# Patient Record
Sex: MALE | Race: WHITE | NOT HISPANIC OR LATINO | Employment: UNEMPLOYED | URBAN - METROPOLITAN AREA
[De-identification: names, ages, dates, MRNs, and addresses within clinical notes are randomized per-mention and may not be internally consistent; named-entity substitution may affect disease eponyms.]

---

## 2021-11-07 ENCOUNTER — HOSPITAL ENCOUNTER (EMERGENCY)
Facility: HOSPITAL | Age: 31
Discharge: HOME | End: 2021-11-07
Attending: EMERGENCY MEDICINE | Admitting: EMERGENCY MEDICINE

## 2021-11-07 ENCOUNTER — APPOINTMENT (OUTPATIENT)
Dept: ULTRASOUND IMAGING | Facility: HOSPITAL | Age: 31
End: 2021-11-07

## 2021-11-07 ENCOUNTER — APPOINTMENT (OUTPATIENT)
Dept: CT IMAGING | Facility: HOSPITAL | Age: 31
End: 2021-11-07

## 2021-11-07 VITALS
TEMPERATURE: 97.1 F | OXYGEN SATURATION: 95 % | RESPIRATION RATE: 20 BRPM | HEIGHT: 70 IN | SYSTOLIC BLOOD PRESSURE: 120 MMHG | DIASTOLIC BLOOD PRESSURE: 69 MMHG | HEART RATE: 76 BPM

## 2021-11-07 DIAGNOSIS — N20.1 LEFT URETERAL STONE: Primary | ICD-10-CM

## 2021-11-07 LAB
ALBUMIN SERPL-MCNC: 4.9 G/DL (ref 3.5–5.2)
ALBUMIN/GLOB SERPL: 2.5 G/DL
ALP SERPL-CCNC: 70 U/L (ref 39–117)
ALT SERPL W P-5'-P-CCNC: 34 U/L (ref 1–41)
ANION GAP SERPL CALCULATED.3IONS-SCNC: 15.4 MMOL/L (ref 5–15)
AST SERPL-CCNC: 17 U/L (ref 1–40)
BACTERIA UR QL AUTO: ABNORMAL /HPF
BASOPHILS # BLD AUTO: 0.11 10*3/MM3 (ref 0–0.2)
BASOPHILS NFR BLD AUTO: 1.4 % (ref 0–1.5)
BILIRUB SERPL-MCNC: 0.4 MG/DL (ref 0–1.2)
BILIRUB UR QL STRIP: NEGATIVE
BUN SERPL-MCNC: 10 MG/DL (ref 6–20)
BUN/CREAT SERPL: 9.2 (ref 7–25)
CALCIUM SPEC-SCNC: 9.5 MG/DL (ref 8.6–10.5)
CHLORIDE SERPL-SCNC: 101 MMOL/L (ref 98–107)
CLARITY UR: ABNORMAL
CO2 SERPL-SCNC: 26.6 MMOL/L (ref 22–29)
COLOR UR: ABNORMAL
CREAT SERPL-MCNC: 1.09 MG/DL (ref 0.76–1.27)
DEPRECATED RDW RBC AUTO: 38.1 FL (ref 37–54)
EOSINOPHIL # BLD AUTO: 0.35 10*3/MM3 (ref 0–0.4)
EOSINOPHIL NFR BLD AUTO: 4.4 % (ref 0.3–6.2)
ERYTHROCYTE [DISTWIDTH] IN BLOOD BY AUTOMATED COUNT: 12.8 % (ref 12.3–15.4)
GFR SERPL CREATININE-BSD FRML MDRD: 79 ML/MIN/1.73
GFR SERPL CREATININE-BSD FRML MDRD: 96 ML/MIN/1.73
GLOBULIN UR ELPH-MCNC: 2 GM/DL
GLUCOSE SERPL-MCNC: 147 MG/DL (ref 65–99)
GLUCOSE UR STRIP-MCNC: NEGATIVE MG/DL
HCT VFR BLD AUTO: 44.6 % (ref 37.5–51)
HGB BLD-MCNC: 15.9 G/DL (ref 13–17.7)
HGB UR QL STRIP.AUTO: ABNORMAL
HYALINE CASTS UR QL AUTO: ABNORMAL /LPF
IMM GRANULOCYTES # BLD AUTO: 0.02 10*3/MM3 (ref 0–0.05)
IMM GRANULOCYTES NFR BLD AUTO: 0.3 % (ref 0–0.5)
KETONES UR QL STRIP: NEGATIVE
LEUKOCYTE ESTERASE UR QL STRIP.AUTO: ABNORMAL
LIPASE SERPL-CCNC: 67 U/L (ref 13–60)
LYMPHOCYTES # BLD AUTO: 3.39 10*3/MM3 (ref 0.7–3.1)
LYMPHOCYTES NFR BLD AUTO: 42.4 % (ref 19.6–45.3)
MCH RBC QN AUTO: 29.7 PG (ref 26.6–33)
MCHC RBC AUTO-ENTMCNC: 35.7 G/DL (ref 31.5–35.7)
MCV RBC AUTO: 83.4 FL (ref 79–97)
MONOCYTES # BLD AUTO: 0.5 10*3/MM3 (ref 0.1–0.9)
MONOCYTES NFR BLD AUTO: 6.3 % (ref 5–12)
NEUTROPHILS NFR BLD AUTO: 3.62 10*3/MM3 (ref 1.7–7)
NEUTROPHILS NFR BLD AUTO: 45.2 % (ref 42.7–76)
NITRITE UR QL STRIP: NEGATIVE
NRBC BLD AUTO-RTO: 0 /100 WBC (ref 0–0.2)
PH UR STRIP.AUTO: 7 [PH] (ref 5–8)
PLATELET # BLD AUTO: 212 10*3/MM3 (ref 140–450)
PMV BLD AUTO: 11.4 FL (ref 6–12)
POTASSIUM SERPL-SCNC: 3.4 MMOL/L (ref 3.5–5.2)
PROT SERPL-MCNC: 6.9 G/DL (ref 6–8.5)
PROT UR QL STRIP: ABNORMAL
RBC # BLD AUTO: 5.35 10*6/MM3 (ref 4.14–5.8)
RBC # UR: ABNORMAL /HPF
REF LAB TEST METHOD: ABNORMAL
SODIUM SERPL-SCNC: 143 MMOL/L (ref 136–145)
SP GR UR STRIP: 1.02 (ref 1–1.03)
SQUAMOUS #/AREA URNS HPF: ABNORMAL /HPF
UROBILINOGEN UR QL STRIP: ABNORMAL
WBC # BLD AUTO: 7.99 10*3/MM3 (ref 3.4–10.8)
WBC UR QL AUTO: ABNORMAL /HPF

## 2021-11-07 PROCEDURE — 25010000002 ONDANSETRON PER 1 MG: Performed by: PHYSICIAN ASSISTANT

## 2021-11-07 PROCEDURE — 96374 THER/PROPH/DIAG INJ IV PUSH: CPT

## 2021-11-07 PROCEDURE — 36415 COLL VENOUS BLD VENIPUNCTURE: CPT

## 2021-11-07 PROCEDURE — 99283 EMERGENCY DEPT VISIT LOW MDM: CPT

## 2021-11-07 PROCEDURE — 85025 COMPLETE CBC W/AUTO DIFF WBC: CPT | Performed by: PHYSICIAN ASSISTANT

## 2021-11-07 PROCEDURE — 25010000002 KETOROLAC TROMETHAMINE PER 15 MG

## 2021-11-07 PROCEDURE — 93976 VASCULAR STUDY: CPT

## 2021-11-07 PROCEDURE — 74176 CT ABD & PELVIS W/O CONTRAST: CPT

## 2021-11-07 PROCEDURE — 81001 URINALYSIS AUTO W/SCOPE: CPT | Performed by: PHYSICIAN ASSISTANT

## 2021-11-07 PROCEDURE — 83690 ASSAY OF LIPASE: CPT | Performed by: PHYSICIAN ASSISTANT

## 2021-11-07 PROCEDURE — 80053 COMPREHEN METABOLIC PANEL: CPT | Performed by: PHYSICIAN ASSISTANT

## 2021-11-07 PROCEDURE — 25010000002 HYDROMORPHONE 1 MG/ML SOLUTION: Performed by: EMERGENCY MEDICINE

## 2021-11-07 PROCEDURE — 76870 US EXAM SCROTUM: CPT

## 2021-11-07 PROCEDURE — 96375 TX/PRO/DX INJ NEW DRUG ADDON: CPT

## 2021-11-07 RX ORDER — HYDROCODONE BITARTRATE AND ACETAMINOPHEN 7.5; 325 MG/1; MG/1
1 TABLET ORAL EVERY 6 HOURS PRN
Qty: 12 TABLET | Refills: 0 | Status: SHIPPED | OUTPATIENT
Start: 2021-11-07

## 2021-11-07 RX ORDER — KETOROLAC TROMETHAMINE 30 MG/ML
INJECTION, SOLUTION INTRAMUSCULAR; INTRAVENOUS
Status: COMPLETED
Start: 2021-11-07 | End: 2021-11-07

## 2021-11-07 RX ORDER — ONDANSETRON 2 MG/ML
4 INJECTION INTRAMUSCULAR; INTRAVENOUS ONCE
Status: COMPLETED | OUTPATIENT
Start: 2021-11-07 | End: 2021-11-07

## 2021-11-07 RX ORDER — ONDANSETRON 4 MG/1
4 TABLET, ORALLY DISINTEGRATING ORAL EVERY 8 HOURS PRN
Qty: 30 TABLET | Refills: 0 | Status: SHIPPED | OUTPATIENT
Start: 2021-11-07

## 2021-11-07 RX ORDER — TAMSULOSIN HYDROCHLORIDE 0.4 MG/1
1 CAPSULE ORAL DAILY
Qty: 30 CAPSULE | Refills: 0 | Status: SHIPPED | OUTPATIENT
Start: 2021-11-07

## 2021-11-07 RX ORDER — SODIUM CHLORIDE 0.9 % (FLUSH) 0.9 %
10 SYRINGE (ML) INJECTION AS NEEDED
Status: DISCONTINUED | OUTPATIENT
Start: 2021-11-07 | End: 2021-11-07 | Stop reason: HOSPADM

## 2021-11-07 RX ADMIN — ONDANSETRON 4 MG: 2 INJECTION INTRAMUSCULAR; INTRAVENOUS at 03:16

## 2021-11-07 RX ADMIN — HYDROMORPHONE HYDROCHLORIDE 1 MG: 1 INJECTION, SOLUTION INTRAMUSCULAR; INTRAVENOUS; SUBCUTANEOUS at 03:14

## 2021-11-07 RX ADMIN — SODIUM CHLORIDE 1000 ML: 9 INJECTION, SOLUTION INTRAVENOUS at 03:15

## 2021-11-07 RX ADMIN — KETOROLAC TROMETHAMINE: 30 INJECTION, SOLUTION INTRAMUSCULAR at 02:37

## 2021-11-07 NOTE — ED PROVIDER NOTES
EMERGENCY DEPARTMENT ENCOUNTER    Room Number:  04/04  Date of encounter:  11/7/2021  PCP: Provider, No Known  Historian: Patient      HPI:  Chief Complaint: Patient  A complete HPI/ROS/PMH/PSH/SH/FH are unobtainable due to: Nothing    Context: Ravi Snyder is a 31 y.o. male who presents to the ED c/o right flank and testicular pain that began after intercourse earlier this evening.  Patient states the pain, rather abruptly and is a 10 out of 10 on the pain scale.  Is been associated with nausea vomiting.  States it feels as if the pain radiates from the testicle into the abdomen.  He denies fever chills, normal bowel movements, shortness of breath, injury, recent sick contacts.  He is here for further evaluation      PAST MEDICAL HISTORY  Active Ambulatory Problems     Diagnosis Date Noted   • No Active Ambulatory Problems     Resolved Ambulatory Problems     Diagnosis Date Noted   • No Resolved Ambulatory Problems     No Additional Past Medical History         PAST SURGICAL HISTORY  No past surgical history on file.      FAMILY HISTORY  No family history on file.      SOCIAL HISTORY  Social History     Socioeconomic History   • Marital status: Single         ALLERGIES  Patient has no known allergies.        REVIEW OF SYSTEMS  Review of Systems   Constitutional: Negative for chills and fever.   HENT: Negative.    Eyes: Negative.    Respiratory: Negative for cough and shortness of breath.    Gastrointestinal: Positive for abdominal pain.   Genitourinary: Positive for testicular pain.   Musculoskeletal: Negative.    Skin: Negative.    Neurological: Negative.    Psychiatric/Behavioral: Negative.         All systems reviewed and negative except for those discussed in HPI.       PHYSICAL EXAM    I have reviewed the triage vital signs and nursing notes.    ED Triage Vitals   Temp Heart Rate Resp BP SpO2   11/07/21 0139 11/07/21 0139 11/07/21 0139 11/07/21 0145 11/07/21 0139   98 °F (36.7 °C) 105 20 146/87 100 %       Temp src Heart Rate Source Patient Position BP Location FiO2 (%)   11/07/21 0139 11/07/21 0139 -- -- --   Temporal Monitor          Physical Exam  GENERAL: Well nourished male, appears uncomfortable nontoxic  HENT: nares patent  EYES: no scleral icterus  CV: regular rhythm, regular rate  RESPIRATORY: normal effort, lungs CTA B  ABDOMEN: soft  : Questionable left testicular tenderness, no obvious CVA tenderness  MUSCULOSKELETAL: no deformity  NEURO: alert, moves all extremities, follows commands  SKIN: warm, dry        LAB RESULTS  Recent Results (from the past 24 hour(s))   Comprehensive Metabolic Panel    Collection Time: 11/07/21  1:51 AM    Specimen: Blood   Result Value Ref Range    Glucose 147 (H) 65 - 99 mg/dL    BUN 10 6 - 20 mg/dL    Creatinine 1.09 0.76 - 1.27 mg/dL    Sodium 143 136 - 145 mmol/L    Potassium 3.4 (L) 3.5 - 5.2 mmol/L    Chloride 101 98 - 107 mmol/L    CO2 26.6 22.0 - 29.0 mmol/L    Calcium 9.5 8.6 - 10.5 mg/dL    Total Protein 6.9 6.0 - 8.5 g/dL    Albumin 4.90 3.50 - 5.20 g/dL    ALT (SGPT) 34 1 - 41 U/L    AST (SGOT) 17 1 - 40 U/L    Alkaline Phosphatase 70 39 - 117 U/L    Total Bilirubin 0.4 0.0 - 1.2 mg/dL    eGFR Non African Amer 79 >60 mL/min/1.73    eGFR  African Amer 96 >60 mL/min/1.73    Globulin 2.0 gm/dL    A/G Ratio 2.5 g/dL    BUN/Creatinine Ratio 9.2 7.0 - 25.0    Anion Gap 15.4 (H) 5.0 - 15.0 mmol/L   Lipase    Collection Time: 11/07/21  1:51 AM    Specimen: Blood   Result Value Ref Range    Lipase 67 (H) 13 - 60 U/L   CBC Auto Differential    Collection Time: 11/07/21  1:51 AM    Specimen: Blood   Result Value Ref Range    WBC 7.99 3.40 - 10.80 10*3/mm3    RBC 5.35 4.14 - 5.80 10*6/mm3    Hemoglobin 15.9 13.0 - 17.7 g/dL    Hematocrit 44.6 37.5 - 51.0 %    MCV 83.4 79.0 - 97.0 fL    MCH 29.7 26.6 - 33.0 pg    MCHC 35.7 31.5 - 35.7 g/dL    RDW 12.8 12.3 - 15.4 %    RDW-SD 38.1 37.0 - 54.0 fl    MPV 11.4 6.0 - 12.0 fL    Platelets 212 140 - 450 10*3/mm3    Neutrophil %  45.2 42.7 - 76.0 %    Lymphocyte % 42.4 19.6 - 45.3 %    Monocyte % 6.3 5.0 - 12.0 %    Eosinophil % 4.4 0.3 - 6.2 %    Basophil % 1.4 0.0 - 1.5 %    Immature Grans % 0.3 0.0 - 0.5 %    Neutrophils, Absolute 3.62 1.70 - 7.00 10*3/mm3    Lymphocytes, Absolute 3.39 (H) 0.70 - 3.10 10*3/mm3    Monocytes, Absolute 0.50 0.10 - 0.90 10*3/mm3    Eosinophils, Absolute 0.35 0.00 - 0.40 10*3/mm3    Basophils, Absolute 0.11 0.00 - 0.20 10*3/mm3    Immature Grans, Absolute 0.02 0.00 - 0.05 10*3/mm3    nRBC 0.0 0.0 - 0.2 /100 WBC   Urinalysis With Microscopic If Indicated (No Culture) - Urine, Clean Catch    Collection Time: 11/07/21  3:18 AM    Specimen: Urine, Clean Catch   Result Value Ref Range    Color, UA Orange (A) Yellow, Straw    Appearance, UA Cloudy (A) Clear    pH, UA 7.0 5.0 - 8.0    Specific Gravity, UA 1.023 1.005 - 1.030    Glucose, UA Negative Negative    Ketones, UA Negative Negative    Bilirubin, UA Negative Negative    Blood, UA Large (3+) (A) Negative    Protein, UA 30 mg/dL (1+) (A) Negative    Leuk Esterase, UA Small (1+) (A) Negative    Nitrite, UA Negative Negative    Urobilinogen, UA 1.0 E.U./dL 0.2 - 1.0 E.U./dL   Urinalysis, Microscopic Only - Urine, Clean Catch    Collection Time: 11/07/21  3:18 AM    Specimen: Urine, Clean Catch   Result Value Ref Range    RBC, UA Too Numerous to Count (A) None Seen, 0-2 /HPF    WBC, UA 3-5 (A) None Seen, 0-2 /HPF    Bacteria, UA None Seen None Seen /HPF    Squamous Epithelial Cells, UA 0-2 None Seen, 0-2 /HPF    Hyaline Casts, UA 0-2 None Seen /LPF    Methodology Automated Microscopy        Ordered the above labs and independently reviewed the results.        RADIOLOGY  US Scrotum & Testicles with doppler    Result Date: 11/7/2021  Patient: JASMIN JENKINS  Time Out: 04:05 Exam(s): US SCROTAL, US OTHER EXAM:   US Scrotum CLINICAL HISTORY:    Testicular pain r o torsion. TECHNIQUE:   Real-time ultrasound of the scrotum with color Doppler and image documentation.  COMPARISON:   No relevant prior studies available. FINDINGS:   Right testicle:  Right testis measures 5.1 x 2.6 x 3.2 cm.  No torsion.   Left testicle:  Left testis measures 5.1 x 2.2 x 2.9 cm.  No torsion.   Epididymides:  A 0.7 x 0.4 x 0.7 cm cyst is seen in the body tail of the right epididymis.   Scrotum:  Unremarkable. IMPRESSION:       No acute findings in the scrotum.     Electronically signed by Grayson Foreman MD on 11-07-21 at 0405      I ordered the above noted radiological studies. Reviewed by me and discussed with radiologist.  See dictation for official radiology interpretation.      PROCEDURES    Procedures      MEDICATIONS GIVEN IN ER    Medications   sodium chloride 0.9 % flush 10 mL (has no administration in time range)   sodium chloride 0.9 % bolus 1,000 mL (0 mL Intravenous Stopped 11/7/21 0324)   ondansetron (ZOFRAN) injection 4 mg (4 mg Intravenous Given 11/7/21 0316)   HYDROmorphone (DILAUDID) injection 1 mg (1 mg Intravenous Given 11/7/21 0314)   ketorolac (TORADOL) 30 MG/ML injection  - ADS Override Pull (  Given 11/7/21 0237)         PROGRESS, DATA ANALYSIS, CONSULTS, AND MEDICAL DECISION MAKING    All labs have been independently reviewed by me.  All radiology studies have been reviewed by me and discussed with radiologist dictating the report.   EKG's independently viewed and interpreted by me.  Discussion below represents my analysis of pertinent findings related to patient's condition, differential diagnosis, treatment plan and final disposition.    DDx includes but is not limited to: Testicular torsion, epididymitis, ureteral stone, pancreatitis, hernia, colitis, colonic abscess, diverticulitis.  Will obtain CBC, CMP, UA, lipase to further evaluate the patient.  Given the time sensitivity of testicular torsion will begin the imaging work-up with a testicular ultrasound.  If this is negative the patient may need a CT of the abdomen pelvis without contrast to rule out kidney  stone.    ED Course as of 11/07/21 0609   Sun Nov 07, 2021 0314 WBC: 7.99 [RC]   0315 RBC: 5.35 [RC]   0315 Hemoglobin: 15.9 [RC]   0315 Hematocrit: 44.6 [RC]   0315 Platelets: 212 [RC]   0315 Lipase(!): 67 [RC]   0315 Glucose(!): 147 [RC]   0315 BUN: 10 [RC]   0315 Creatinine: 1.09 [RC]   0315 Sodium: 143 [RC]   0315 Potassium(!): 3.4 [RC]   0315 CO2: 26.6 [RC]   0315 Anion Gap(!): 15.4 [RC]   0315 CT Abdomen Pelvis Without Contrast [RC]   0318 Bacteria, UA: None Seen [RC]   0318 RBC, UA(!): Too Numerous to Count [RC]   0318 Ultrasound negative, CT abdomen pelvis ordered to further evaluate for a ureteral stone.  As noted previously urine showing TNTC RBCs [RC]   0450 I viewed the patient CT abdomen pelvis without contrast.  There appears to be a 3 mm stone in the proximal left ureter [RC]   0450 Patient's pain appears well controlled at this time he is sleeping.  We will [facility] the patient up with [facility], treat with a short course of pain medication, Flomax, Zofran.  Will provide the patient with a urine strainer to catch the stone for analysis.  We will have the patient return to the emergency department should his pain not be controlled with the medication prescribed, should he develop a fever, should he be unable to hold down solids and liquids will have him return to the emergency department. [RC]      ED Course User Index  [RC] Rosalino Yoder III, PA       Patient was placed in face mask in first look. Patient was wearing facemask when I entered the room and throughout our encounter. I wore full protective equipment throughout this patient encounter including a face mask, and gloves. Hand hygiene was performed before donning protective equipment and after removal when leaving the room.    AS OF 06:09 EST VITALS:    BP - 120/69  HR - 76  TEMP - 97.1 °F (36.2 °C)  O2 SATS - 95%        DIAGNOSIS  Final diagnoses:   Left ureteral stone         DISPOSITION  DISCHARGE    Patient discharged in stable  condition.    Reviewed implications of results, diagnosis, meds, responsibility to follow up, warning signs and symptoms of possible worsening, potential complications and reasons to return to ER.    Patient/Family voiced understanding of above instructions.    Discussed plan for discharge, as there is no emergent indication for admission. Patient referred to primary care provider for BP management due to today's BP. Pt/family is agreeable and understands need for follow up and repeat testing.  Pt is aware that discharge does not mean that nothing is wrong but it indicates no emergency is present that requires admission and they must continue care with follow-up as given below or physician of their choice.     FOLLOW-UP  [facility]  [address] [address]  [phone]             Medication List      New Prescriptions    diclofenac 50 MG EC tablet  Commonly known as: VOLTAREN  Take 1 tablet by mouth 3 (Three) Times a Day.     HYDROcodone-acetaminophen 7.5-325 MG per tablet  Commonly known as: NORCO  Take 1 tablet by mouth Every 6 (Six) Hours As Needed for Moderate Pain .     ondansetron ODT 4 MG disintegrating tablet  Commonly known as: Zofran ODT  Place 1 tablet on the tongue Every 8 (Eight) Hours As Needed for Nausea or Vomiting.     tamsulosin 0.4 MG capsule 24 hr capsule  Commonly known as: FLOMAX  Take 1 capsule by mouth Daily.           Where to Get Your Medications      These medications were sent to [id number] - [place], [place] - [address] - [phone]  - [phone]   [address] [address]    Phone: [phone]   · diclofenac 50 MG EC tablet  · HYDROcodone-acetaminophen 7.5-325 MG per tablet  · ondansetron ODT 4 MG disintegrating tablet  · tamsulosin 0.4 MG capsule 24 hr capsule                Rosalino Yoder III, PA  11/07/21 3042

## 2021-11-07 NOTE — ED NOTES
Pt ambulatory to triage from home with c/o left groin pain that started during intercourse.  Pt wearing mask in triage.  Triage personnel wore appropriate PPE       Anna Kuo RN  11/07/21 0137

## 2021-11-07 NOTE — ED PROVIDER NOTES
MD ATTESTATION NOTE    The MARILIN and I have discussed this patient's history, physical exam, and treatment plan.  I have reviewed the documentation and personally had a face to face interaction with the patient. I affirm the documentation and agree with the treatment and plan.  The attached note describes my personal findings.      Ravi Sndyer is a 31 y.o. male who presents to the ED c/o testicular pain after intercourse this evening.  States it started abruptly states having nausea and vomiting.  States that the pain radiates to his right flank.  No fevers chills no nausea vomiting.      On exam:  Mild acute distress, normocephalic atraumatic, supple nontender, regular rate and rhythm, clear to auscultation bilaterally, mild left testicular tenderness no obvious deformity, left CVA tenderness nondistended, moving all extremities well    Labs  Recent Results (from the past 24 hour(s))   Comprehensive Metabolic Panel    Collection Time: 11/07/21  1:51 AM    Specimen: Blood   Result Value Ref Range    Glucose 147 (H) 65 - 99 mg/dL    BUN 10 6 - 20 mg/dL    Creatinine 1.09 0.76 - 1.27 mg/dL    Sodium 143 136 - 145 mmol/L    Potassium 3.4 (L) 3.5 - 5.2 mmol/L    Chloride 101 98 - 107 mmol/L    CO2 26.6 22.0 - 29.0 mmol/L    Calcium 9.5 8.6 - 10.5 mg/dL    Total Protein 6.9 6.0 - 8.5 g/dL    Albumin 4.90 3.50 - 5.20 g/dL    ALT (SGPT) 34 1 - 41 U/L    AST (SGOT) 17 1 - 40 U/L    Alkaline Phosphatase 70 39 - 117 U/L    Total Bilirubin 0.4 0.0 - 1.2 mg/dL    eGFR Non African Amer 79 >60 mL/min/1.73    eGFR  African Amer 96 >60 mL/min/1.73    Globulin 2.0 gm/dL    A/G Ratio 2.5 g/dL    BUN/Creatinine Ratio 9.2 7.0 - 25.0    Anion Gap 15.4 (H) 5.0 - 15.0 mmol/L   Lipase    Collection Time: 11/07/21  1:51 AM    Specimen: Blood   Result Value Ref Range    Lipase 67 (H) 13 - 60 U/L   CBC Auto Differential    Collection Time: 11/07/21  1:51 AM    Specimen: Blood   Result Value Ref Range    WBC 7.99 3.40 - 10.80 10*3/mm3    RBC  5.35 4.14 - 5.80 10*6/mm3    Hemoglobin 15.9 13.0 - 17.7 g/dL    Hematocrit 44.6 37.5 - 51.0 %    MCV 83.4 79.0 - 97.0 fL    MCH 29.7 26.6 - 33.0 pg    MCHC 35.7 31.5 - 35.7 g/dL    RDW 12.8 12.3 - 15.4 %    RDW-SD 38.1 37.0 - 54.0 fl    MPV 11.4 6.0 - 12.0 fL    Platelets 212 140 - 450 10*3/mm3    Neutrophil % 45.2 42.7 - 76.0 %    Lymphocyte % 42.4 19.6 - 45.3 %    Monocyte % 6.3 5.0 - 12.0 %    Eosinophil % 4.4 0.3 - 6.2 %    Basophil % 1.4 0.0 - 1.5 %    Immature Grans % 0.3 0.0 - 0.5 %    Neutrophils, Absolute 3.62 1.70 - 7.00 10*3/mm3    Lymphocytes, Absolute 3.39 (H) 0.70 - 3.10 10*3/mm3    Monocytes, Absolute 0.50 0.10 - 0.90 10*3/mm3    Eosinophils, Absolute 0.35 0.00 - 0.40 10*3/mm3    Basophils, Absolute 0.11 0.00 - 0.20 10*3/mm3    Immature Grans, Absolute 0.02 0.00 - 0.05 10*3/mm3    nRBC 0.0 0.0 - 0.2 /100 WBC   Urinalysis With Microscopic If Indicated (No Culture) - Urine, Clean Catch    Collection Time: 11/07/21  3:18 AM    Specimen: Urine, Clean Catch   Result Value Ref Range    Color, UA Orange (A) Yellow, Straw    Appearance, UA Cloudy (A) Clear    pH, UA 7.0 5.0 - 8.0    Specific Gravity, UA 1.023 1.005 - 1.030    Glucose, UA Negative Negative    Ketones, UA Negative Negative    Bilirubin, UA Negative Negative    Blood, UA Large (3+) (A) Negative    Protein, UA 30 mg/dL (1+) (A) Negative    Leuk Esterase, UA Small (1+) (A) Negative    Nitrite, UA Negative Negative    Urobilinogen, UA 1.0 E.U./dL 0.2 - 1.0 E.U./dL   Urinalysis, Microscopic Only - Urine, Clean Catch    Collection Time: 11/07/21  3:18 AM    Specimen: Urine, Clean Catch   Result Value Ref Range    RBC, UA Too Numerous to Count (A) None Seen, 0-2 /HPF    WBC, UA 3-5 (A) None Seen, 0-2 /HPF    Bacteria, UA None Seen None Seen /HPF    Squamous Epithelial Cells, UA 0-2 None Seen, 0-2 /HPF    Hyaline Casts, UA 0-2 None Seen /LPF    Methodology Automated Microscopy        Radiology  US Scrotum & Testicles with doppler    Result Date:  11/7/2021  Patient: JASMIN JENKINS  Time Out: 04:05 Exam(s): US SCROTAL, US OTHER EXAM:   US Scrotum CLINICAL HISTORY:    Testicular pain r o torsion. TECHNIQUE:   Real-time ultrasound of the scrotum with color Doppler and image documentation. COMPARISON:   No relevant prior studies available. FINDINGS:   Right testicle:  Right testis measures 5.1 x 2.6 x 3.2 cm.  No torsion.   Left testicle:  Left testis measures 5.1 x 2.2 x 2.9 cm.  No torsion.   Epididymides:  A 0.7 x 0.4 x 0.7 cm cyst is seen in the body tail of the right epididymis.   Scrotum:  Unremarkable. IMPRESSION:       No acute findings in the scrotum.     Electronically signed by Grayson Foreman MD on 11-07-21 at 0405      Medical Decision Making:  ED Course as of 11/07/21 0607   Sun Nov 07, 2021   0314 WBC: 7.99 [RC]   0315 RBC: 5.35 [RC]   0315 Hemoglobin: 15.9 [RC]   0315 Hematocrit: 44.6 [RC]   0315 Platelets: 212 [RC]   0315 Lipase(!): 67 [RC]   0315 Glucose(!): 147 [RC]   0315 BUN: 10 [RC]   0315 Creatinine: 1.09 [RC]   0315 Sodium: 143 [RC]   0315 Potassium(!): 3.4 [RC]   0315 CO2: 26.6 [RC]   0315 Anion Gap(!): 15.4 [RC]   0315 CT Abdomen Pelvis Without Contrast [RC]   0318 Bacteria, UA: None Seen [RC]   0318 RBC, UA(!): Too Numerous to Count [RC]   0318 Ultrasound negative, CT abdomen pelvis ordered to further evaluate for a ureteral stone.  As noted previously urine showing TNTC RBCs [RC]   0450 I viewed the patient CT abdomen pelvis without contrast.  There appears to be a 3 mm stone in the proximal left ureter [RC]   0450 Patient's pain appears well controlled at this time he is sleeping.  We will [facility] the patient up with [facility], treat with a short course of pain medication, Flomax, Zofran.  Will provide the patient with a urine strainer to catch the stone for analysis.  We will have the patient return to the emergency department should his pain not be controlled with the medication prescribed, should he develop a fever, should  he be unable to hold down solids and liquids will have him return to the emergency department. [RC]      ED Course User Index  [RC] Rosalino Yoder III, PA           PPE: Both the patient and I wore a surgical mask throughout the entire patient encounter. I wore protective goggles.     Diagnosis  Final diagnoses:   Left ureteral stone        Bandar Cabrera MD  11/07/21 0607

## 2021-11-07 NOTE — DISCHARGE INSTRUCTIONS
Return to the emergency department should your pain not be controlled the medication prescribed, should you develop fever, should you be unable to hold down solids or liquids, or should you have any further concerns.  Recommend following up with [facility] at the number above for further evaluation.  You have been provided with a urine strainer and a specimen cup should you want to attempt to catch the stone for analyzation.

## 2023-09-03 ENCOUNTER — HOSPITAL ENCOUNTER (EMERGENCY)
Facility: HOSPITAL | Age: 33
Discharge: HOME | End: 2023-09-03
Attending: EMERGENCY MEDICINE | Admitting: EMERGENCY MEDICINE
Payer: COMMERCIAL

## 2023-09-03 ENCOUNTER — APPOINTMENT (OUTPATIENT)
Dept: GENERAL RADIOLOGY | Facility: HOSPITAL | Age: 33
End: 2023-09-03
Payer: COMMERCIAL

## 2023-09-03 VITALS
BODY MASS INDEX: 22.19 KG/M2 | HEART RATE: 63 BPM | DIASTOLIC BLOOD PRESSURE: 80 MMHG | RESPIRATION RATE: 16 BRPM | TEMPERATURE: 97.8 F | OXYGEN SATURATION: 95 % | WEIGHT: 155 LBS | HEIGHT: 70 IN | SYSTOLIC BLOOD PRESSURE: 132 MMHG

## 2023-09-03 DIAGNOSIS — S39.012A STRAIN OF LUMBAR REGION, INITIAL ENCOUNTER: Primary | ICD-10-CM

## 2023-09-03 PROCEDURE — 96372 THER/PROPH/DIAG INJ SC/IM: CPT

## 2023-09-03 PROCEDURE — 99283 EMERGENCY DEPT VISIT LOW MDM: CPT

## 2023-09-03 PROCEDURE — 25010000002 KETOROLAC TROMETHAMINE PER 15 MG: Performed by: NURSE PRACTITIONER

## 2023-09-03 PROCEDURE — 72110 X-RAY EXAM L-2 SPINE 4/>VWS: CPT

## 2023-09-03 RX ORDER — HYDROCODONE BITARTRATE AND ACETAMINOPHEN 7.5; 325 MG/1; MG/1
1 TABLET ORAL ONCE
Status: COMPLETED | OUTPATIENT
Start: 2023-09-03 | End: 2023-09-03

## 2023-09-03 RX ORDER — CYCLOBENZAPRINE HCL 10 MG
10 TABLET ORAL 3 TIMES DAILY PRN
Qty: 18 TABLET | Refills: 0 | Status: SHIPPED | OUTPATIENT
Start: 2023-09-03

## 2023-09-03 RX ORDER — DEXTROAMPHETAMINE SACCHARATE, AMPHETAMINE ASPARTATE, DEXTROAMPHETAMINE SULFATE AND AMPHETAMINE SULFATE 1.25; 1.25; 1.25; 1.25 MG/1; MG/1; MG/1; MG/1
5 TABLET ORAL 2 TIMES DAILY
COMMUNITY
Start: 2023-08-24

## 2023-09-03 RX ORDER — DEXTROAMPHETAMINE SACCHARATE, AMPHETAMINE ASPARTATE, DEXTROAMPHETAMINE SULFATE AND AMPHETAMINE SULFATE 5; 5; 5; 5 MG/1; MG/1; MG/1; MG/1
20 TABLET ORAL 2 TIMES DAILY
COMMUNITY
Start: 2023-08-24

## 2023-09-03 RX ORDER — KETOROLAC TROMETHAMINE 30 MG/ML
30 INJECTION, SOLUTION INTRAMUSCULAR; INTRAVENOUS ONCE
Status: COMPLETED | OUTPATIENT
Start: 2023-09-03 | End: 2023-09-03

## 2023-09-03 RX ORDER — CYCLOBENZAPRINE HCL 10 MG
10 TABLET ORAL ONCE
Status: COMPLETED | OUTPATIENT
Start: 2023-09-03 | End: 2023-09-03

## 2023-09-03 RX ORDER — HYDROCODONE BITARTRATE AND ACETAMINOPHEN 5; 325 MG/1; MG/1
1 TABLET ORAL EVERY 4 HOURS PRN
Qty: 8 TABLET | Refills: 0 | Status: SHIPPED | OUTPATIENT
Start: 2023-09-03

## 2023-09-03 RX ORDER — MELOXICAM 15 MG/1
15 TABLET ORAL DAILY
Qty: 14 TABLET | Refills: 0 | Status: SHIPPED | OUTPATIENT
Start: 2023-09-03

## 2023-09-03 RX ADMIN — KETOROLAC TROMETHAMINE 30 MG: 30 INJECTION, SOLUTION INTRAMUSCULAR at 15:23

## 2023-09-03 RX ADMIN — CYCLOBENZAPRINE HYDROCHLORIDE 10 MG: 10 TABLET, FILM COATED ORAL at 14:23

## 2023-09-03 RX ADMIN — HYDROCODONE BITARTRATE AND ACETAMINOPHEN 1 TABLET: 7.5; 325 TABLET ORAL at 14:23

## 2023-09-03 NOTE — ED NOTES
Pt to Ed for lower back pain that started hurting after lifting boxes about a week ago. Pt was seen by chiropractor about 6-7 months ago and told he has a L5 fx.

## 2023-09-03 NOTE — ED PROVIDER NOTES
EMERGENCY DEPARTMENT ENCOUNTER    Room Number:  31/31  Date of encounter:  9/3/2023  PCP: Provider, No Known  Patient Care Team:  Provider, No Known as PCP - General   Independent Historians: Patient and family        HPI:  Chief Complaint: Back pain  A complete HPI/ROS/PMH/PSH/SH/FH are unobtainable due to: Nothing    Chronic or social conditions impacting patient care (social determinants of health): None    Context: Ravi Snyder is a 32 y.o. male who arrives to the ED via private vehicle.  Patient presents with c/o mild to moderate, achy, intermittent left lower back pain for the past week.  Patient states a week ago he was moving a treadmill and some other heavy furniture when he first noticed his back hurting.  He states yesterday it worsened.   He states that the pain is worse with any movement or position change.  He has been taking Advil but states he gets no relief from that.  Patient denies numbness or tingling to his lower extremities, loss of bowel or bladder function, saddle paresthesia, fever, chills.  Patient states that remaining still makes the symptoms better and movement and position changes worsens symptoms.      Review of prior external notes (non-ED): Medical records reviewed in epic, patient last seen at [facility] on 3/20/2022 for a finger laceration.    Review of prior external test results outside of this encounter: CT abdomen and pelvis performed 11/7/2021 showed mild left hydronephrosis secondary to a 5 mm stone in the proximal left ureter.      PAST MEDICAL HISTORY  Active Ambulatory Problems     Diagnosis Date Noted    No Active Ambulatory Problems     Resolved Ambulatory Problems     Diagnosis Date Noted    No Resolved Ambulatory Problems     Past Medical History:   Diagnosis Date    Kidney stone        The patient qualifies to receive the vaccine, but they have not yet received it.    PAST SURGICAL HISTORY  History reviewed. No pertinent surgical history.      FAMILY  HISTORY  History reviewed. No pertinent family history.      SOCIAL HISTORY  Social History     Socioeconomic History    Marital status: Single   Tobacco Use    Smoking status: Never    Smokeless tobacco: Former     Types: Chew   Vaping Use    Vaping Use: Never used   Substance and Sexual Activity    Alcohol use: Never    Drug use: Never    Sexual activity: Defer         ALLERGIES  Patient has no known allergies.        REVIEW OF SYSTEMS  Review of Systems     All systems reviewed and negative except for those discussed in HPI.       PHYSICAL EXAM    I have reviewed the triage vital signs and nursing notes.    ED Triage Vitals   Temp Heart Rate Resp BP SpO2   09/03/23 1323 09/03/23 1323 09/03/23 1323 09/03/23 1337 09/03/23 1323   97.8 °F (36.6 °C) 96 18 148/85 97 %       Physical Exam  GENERAL: Well appearing, nontoxic appearing, not distressed  HENT: normocephalic, atraumatic  EYES: no scleral icterus, PERRL  CV: regular rhythm, regular rate, no murmur  RESPIRATORY: normal effort, CTAB  ABDOMEN: soft   MUSCULOSKELETAL: no deformity  Lumbar vertebral tenderness to palpation  No step off or crepitus noted  Left lumbar paraspinal tenderness to palpation  Negative straight Leg Raises bilaterally  5/5 muscle strength with dorsiflexion and flexion  2+ DP & PT pulses bilaterally  Normal sensation to bilateral lower extremities, no saddle paresthesia  NEURO: alert, moves all extremities, follows commands, mental status normal/baseline  SKIN: warm, dry, no rash   Psych: Appropriate mood and affect  Nursing notes and vital signs reviewed          LAB RESULTS  No results found for this or any previous visit (from the past 24 hour(s)).    Ordered the above labs and independently reviewed the results.        RADIOLOGY  XR Spine Lumbar Complete 4+VW    Result Date: 9/3/2023  LUMBAR SPINE X-RAY  HISTORY: Low back pain after moving a very heavy object.  TECHNIQUE: 5 x-rays of the lumbosacral spine are provided. Correlation with  abdomen/pelvis CT November 7, 2021.  FINDINGS: There our old bilateral L5 pars defects with about 6 mm anterolisthesis of L5 on S1. Alignment at the other levels is normal. Vertebral body height and disc height are normal.      Grade 1 anterolisthesis at L5-S1 where there are old bilateral L5 pars defects. No acute abnormality identified.    This report was finalized on 9/3/2023 3:02 PM by Dr. Dino Peraza M.D.       I ordered the above noted radiological studies. Reviewed by me and discussed with radiologist.  See dictation for official radiology interpretation.      PROCEDURES    Procedures    DIFFERENTIAL DIAGNOSIS:  Differential Diagnosis for back pain includes but is not limited to the following:  Musculoskeletal pain, contusion, Disc protrusion, Vertebral fracture, Rib fracture, Sciatica, Osteoarthritis, Spinal Stenosis, Kidney stone      PROGRESS, DATA ANALYSIS, CONSULTS, AND MEDICAL DECISION MAKING    All labs have been independently reviewed by me.  All radiology studies have been reviewed by me and discussed with radiologist dictating the report.   EKG's independently viewed and interpreted by me.  Discussion below represents my analysis of pertinent findings related to patient's condition, differential diagnosis, treatment plan and final disposition.        ED Course as of 09/03/23 1554   Sun Sep 03, 2023   1422 Patient is a 32-year-old who presents today with low back pain for the past week after moving a treadmill and other heavy furniture.  No red flags seen on exam.  Plan for x-rays of the lumbar spine and pain control. [MS]   1430 Reviewed pt's history and workup with Dr. Truong.       [MS]   1510 Radiology study lumbar spine independently interpreted by me and my findings are no fracture, normal alignment.  See dictation for official radiology interpretation.   [MS]   1547 The patient presents with acute onset of back pain after moving a treadmill 1 week ago.  Clinically this patient can be  ruled out for serious pathology given there is a completely normal neurological exam, no history of IV drug use, no history of bowel or bladder incontinence, no numbness or tingling to lower extremities, no constipation or urinary retention.  Once the patient's pain was adequately controlled with Vicodin, Flexeril & Toradol.  The patient was able to ambulate and be discharged in stable condition with PMD follow-up.     [MS]      ED Course User Index  [MS] Vinita Reese, APRN         DISPOSITION  ED Disposition       ED Disposition   Discharge    Condition   Stable    Comment   --             Discussed plan for discharge, as there is no emergent indication for admission. Pt/family is agreeable and understands need for follow up and repeat testing.  Pt is aware that discharge does not mean that nothing is wrong but it indicates no emergency is present that requires admission and they must continue care with follow-up as given below or physician of their choice.   Patient/Family voiced understanding of above instructions.  Patient discharged in stable condition.    DIAGNOSIS  Final diagnoses:   Strain of lumbar region, initial encounter       FOLLOW UP   PATIENT CONNECTION - [place] [id number]  [phone]  Call in 2 days        RX     Medication List        New Prescriptions      cyclobenzaprine 10 MG tablet  Commonly known as: FLEXERIL  Take 1 tablet by mouth 3 (Three) Times a Day As Needed for Muscle Spasms.     meloxicam 15 MG tablet  Commonly known as: MOBIC  Take 1 tablet by mouth Daily.            Changed      * HYDROcodone-acetaminophen 7.5-325 MG per tablet  Commonly known as: NORCO  Take 1 tablet by mouth Every 6 (Six) Hours As Needed for Moderate Pain .  What changed: Another medication with the same name was added. Make sure you understand how and when to take each.     * HYDROcodone-acetaminophen 5-325 MG per tablet  Commonly known as: NORCO  Take 1 tablet by mouth Every 4  (Four) Hours As Needed for Moderate Pain.  What changed: You were already taking a medication with the same name, and this prescription was added. Make sure you understand how and when to take each.           * This list has 2 medication(s) that are the same as other medications prescribed for you. Read the directions carefully, and ask your doctor or other care provider to review them with you.                   Where to Get Your Medications        These medications were sent to [id number] - [place], [place] - [address] AT [facility] [address] - [phone] PH - [phone] FX  [address], [place] [address]      Phone: [phone]   cyclobenzaprine 10 MG tablet  HYDROcodone-acetaminophen 5-325 MG per tablet  meloxicam 15 MG tablet         Josue report  reviewed.  Risks, benefits, alternatives discussed with patient.  Pt consents to treatment and agrees to follow up with PMD tomorrow for further care and any other prescriptions.               AS OF 15:54 EDT VITALS:    BP - 132/80  HR - 63  TEMP - 97.8 °F (36.6 °C)  O2 SATS - 95%      MEDICATIONS GIVEN IN ER    Medications   HYDROcodone-acetaminophen (NORCO) 7.5-325 MG per tablet 1 tablet (1 tablet Oral Given 9/3/23 1423)   cyclobenzaprine (FLEXERIL) tablet 10 mg (10 mg Oral Given 9/3/23 1423)   ketorolac (TORADOL) injection 30 mg (30 mg Intramuscular Given 9/3/23 1523)                Note Disclaimer: At [facility], we believe that sharing information builds trust and better relationships. You are receiving this note because you recently visited [facility]. It is possible you will see health information before a provider has talked with you about it. This kind of information can be easy to misunderstand. To help you fully understand what it means for your health, we urge you to discuss this note with your provider.         Vinita Reese, APRN  09/03/23 9902

## 2023-09-18 ENCOUNTER — TELEPHONE (OUTPATIENT)
Dept: NEUROSURGERY | Facility: CLINIC | Age: 33
End: 2023-09-18
Payer: COMMERCIAL

## 2023-09-18 NOTE — TELEPHONE ENCOUNTER
Advised patient that they needed to f/u with PCP for this request. Patient stated the pharmacy sent this request, patient did not and patient cancelled request as of yesterday so it should not come back to this office.